# Patient Record
Sex: FEMALE | Race: WHITE | NOT HISPANIC OR LATINO | Employment: FULL TIME | RURAL
[De-identification: names, ages, dates, MRNs, and addresses within clinical notes are randomized per-mention and may not be internally consistent; named-entity substitution may affect disease eponyms.]

---

## 2023-05-10 ENCOUNTER — LAB VISIT (OUTPATIENT)
Dept: LAB | Facility: HOSPITAL | Age: 23
End: 2023-05-10
Attending: REGISTERED NURSE
Payer: COMMERCIAL

## 2023-05-10 ENCOUNTER — OFFICE VISIT (OUTPATIENT)
Dept: FAMILY MEDICINE | Facility: CLINIC | Age: 23
End: 2023-05-10
Payer: COMMERCIAL

## 2023-05-10 VITALS
DIASTOLIC BLOOD PRESSURE: 89 MMHG | SYSTOLIC BLOOD PRESSURE: 133 MMHG | WEIGHT: 180 LBS | TEMPERATURE: 99 F | BODY MASS INDEX: 33.13 KG/M2 | HEART RATE: 90 BPM | HEIGHT: 62 IN | OXYGEN SATURATION: 98 %

## 2023-05-10 DIAGNOSIS — R42 VERTIGO: Primary | ICD-10-CM

## 2023-05-10 DIAGNOSIS — R00.0 TACHYCARDIA: ICD-10-CM

## 2023-05-10 DIAGNOSIS — R00.0 TACHYCARDIA, UNSPECIFIED: Primary | ICD-10-CM

## 2023-05-10 DIAGNOSIS — R42 VERTIGO: ICD-10-CM

## 2023-05-10 DIAGNOSIS — G43.019 INTRACTABLE MIGRAINE WITHOUT AURA AND WITHOUT STATUS MIGRAINOSUS: ICD-10-CM

## 2023-05-10 PROBLEM — F41.9 ANXIETY: Status: ACTIVE | Noted: 2022-09-29

## 2023-05-10 PROBLEM — F42.9 OCD (OBSESSIVE COMPULSIVE DISORDER): Status: ACTIVE | Noted: 2022-09-29

## 2023-05-10 LAB
ALBUMIN SERPL BCP-MCNC: 3.7 G/DL (ref 3.5–5)
ALBUMIN/GLOB SERPL: 0.9 {RATIO}
ALP SERPL-CCNC: 54 U/L (ref 52–144)
ALT SERPL W P-5'-P-CCNC: 24 U/L (ref 13–56)
ANION GAP SERPL CALCULATED.3IONS-SCNC: 12 MMOL/L (ref 7–16)
AST SERPL W P-5'-P-CCNC: 18 U/L (ref 15–37)
BASOPHILS # BLD AUTO: 0.03 K/UL (ref 0–0.2)
BASOPHILS NFR BLD AUTO: 0.3 % (ref 0–1)
BILIRUB SERPL-MCNC: 0.2 MG/DL (ref ?–1.2)
BUN SERPL-MCNC: 11 MG/DL (ref 7–18)
BUN/CREAT SERPL: 16 (ref 6–20)
CALCIUM SERPL-MCNC: 8.7 MG/DL (ref 8.5–10.1)
CHLORIDE SERPL-SCNC: 100 MMOL/L (ref 98–107)
CO2 SERPL-SCNC: 28 MMOL/L (ref 21–32)
CREAT SERPL-MCNC: 0.67 MG/DL (ref 0.55–1.02)
DIFFERENTIAL METHOD BLD: ABNORMAL
EGFR (NO RACE VARIABLE) (RUSH/TITUS): 129 ML/MIN/1.73M2
EOSINOPHIL # BLD AUTO: 0.09 K/UL (ref 0–0.5)
EOSINOPHIL NFR BLD AUTO: 1 % (ref 1–4)
ERYTHROCYTE [DISTWIDTH] IN BLOOD BY AUTOMATED COUNT: 13.2 % (ref 11.5–14.5)
GLOBULIN SER-MCNC: 4.3 G/DL (ref 2–4)
GLUCOSE SERPL-MCNC: 109 MG/DL (ref 74–106)
HCT VFR BLD AUTO: 38.9 % (ref 38–47)
HGB BLD-MCNC: 12.8 G/DL (ref 12–16)
LYMPHOCYTES # BLD AUTO: 3.01 K/UL (ref 1–4.8)
LYMPHOCYTES NFR BLD AUTO: 33.7 % (ref 27–41)
MCH RBC QN AUTO: 27.4 PG (ref 27–31)
MCHC RBC AUTO-ENTMCNC: 32.9 G/DL (ref 32–36)
MCV RBC AUTO: 83.3 FL (ref 80–96)
MONOCYTES # BLD AUTO: 0.78 K/UL (ref 0–0.8)
MONOCYTES NFR BLD AUTO: 8.7 % (ref 2–6)
MPC BLD CALC-MCNC: 9.2 FL (ref 9.4–12.4)
NEUTROPHILS # BLD AUTO: 5.02 K/UL (ref 1.8–7.7)
NEUTROPHILS NFR BLD AUTO: 56.3 % (ref 53–65)
PLATELET # BLD AUTO: 413 K/UL (ref 150–400)
POTASSIUM SERPL-SCNC: 3.5 MMOL/L (ref 3.5–5.1)
PROT SERPL-MCNC: 8 G/DL (ref 6.4–8.2)
RBC # BLD AUTO: 4.67 M/UL (ref 4.2–5.4)
SODIUM SERPL-SCNC: 136 MMOL/L (ref 136–145)
WBC # BLD AUTO: 8.93 K/UL (ref 4.5–11)

## 2023-05-10 PROCEDURE — 83550 IRON BINDING TEST: CPT

## 2023-05-10 PROCEDURE — 80053 COMPREHEN METABOLIC PANEL: CPT

## 2023-05-10 PROCEDURE — 93010 EKG 12-LEAD: ICD-10-PCS | Mod: ,,, | Performed by: INTERNAL MEDICINE

## 2023-05-10 PROCEDURE — 1159F MED LIST DOCD IN RCRD: CPT | Mod: ,,, | Performed by: REGISTERED NURSE

## 2023-05-10 PROCEDURE — 1160F PR REVIEW ALL MEDS BY PRESCRIBER/CLIN PHARMACIST DOCUMENTED: ICD-10-PCS | Mod: ,,, | Performed by: REGISTERED NURSE

## 2023-05-10 PROCEDURE — 93010 ELECTROCARDIOGRAM REPORT: CPT | Mod: ,,, | Performed by: INTERNAL MEDICINE

## 2023-05-10 PROCEDURE — 83540 ASSAY OF IRON: CPT

## 2023-05-10 PROCEDURE — 93005 ELECTROCARDIOGRAM TRACING: CPT

## 2023-05-10 PROCEDURE — 3079F PR MOST RECENT DIASTOLIC BLOOD PRESSURE 80-89 MM HG: ICD-10-PCS | Mod: ,,, | Performed by: REGISTERED NURSE

## 2023-05-10 PROCEDURE — 99204 OFFICE O/P NEW MOD 45 MIN: CPT | Mod: ,,, | Performed by: REGISTERED NURSE

## 2023-05-10 PROCEDURE — 3008F PR BODY MASS INDEX (BMI) DOCUMENTED: ICD-10-PCS | Mod: ,,, | Performed by: REGISTERED NURSE

## 2023-05-10 PROCEDURE — 99204 PR OFFICE/OUTPT VISIT, NEW, LEVL IV, 45-59 MIN: ICD-10-PCS | Mod: ,,, | Performed by: REGISTERED NURSE

## 2023-05-10 PROCEDURE — 3075F PR MOST RECENT SYSTOLIC BLOOD PRESS GE 130-139MM HG: ICD-10-PCS | Mod: ,,, | Performed by: REGISTERED NURSE

## 2023-05-10 PROCEDURE — 1160F RVW MEDS BY RX/DR IN RCRD: CPT | Mod: ,,, | Performed by: REGISTERED NURSE

## 2023-05-10 PROCEDURE — 3079F DIAST BP 80-89 MM HG: CPT | Mod: ,,, | Performed by: REGISTERED NURSE

## 2023-05-10 PROCEDURE — 3008F BODY MASS INDEX DOCD: CPT | Mod: ,,, | Performed by: REGISTERED NURSE

## 2023-05-10 PROCEDURE — 36415 COLL VENOUS BLD VENIPUNCTURE: CPT

## 2023-05-10 PROCEDURE — 3075F SYST BP GE 130 - 139MM HG: CPT | Mod: ,,, | Performed by: REGISTERED NURSE

## 2023-05-10 PROCEDURE — 82746 ASSAY OF FOLIC ACID SERUM: CPT

## 2023-05-10 PROCEDURE — 85025 COMPLETE CBC W/AUTO DIFF WBC: CPT

## 2023-05-10 PROCEDURE — 1159F PR MEDICATION LIST DOCUMENTED IN MEDICAL RECORD: ICD-10-PCS | Mod: ,,, | Performed by: REGISTERED NURSE

## 2023-05-10 RX ORDER — NORETHINDRONE ACETATE AND ETHINYL ESTRADIOL 1MG-20(21)
1 KIT ORAL DAILY
COMMUNITY
Start: 2022-09-06 | End: 2023-11-01 | Stop reason: ALTCHOICE

## 2023-05-10 RX ORDER — BUTALBITAL, ACETAMINOPHEN AND CAFFEINE 50; 325; 40 MG/1; MG/1; MG/1
1 TABLET ORAL EVERY 6 HOURS PRN
Qty: 30 TABLET | Refills: 0 | Status: SHIPPED | OUTPATIENT
Start: 2023-05-10 | End: 2023-07-09

## 2023-05-10 RX ORDER — UBROGEPANT 50 MG/1
50 TABLET ORAL
Qty: 10 TABLET | Refills: 0 | Status: SHIPPED | OUTPATIENT
Start: 2023-05-10 | End: 2023-11-01 | Stop reason: ALTCHOICE

## 2023-05-10 RX ORDER — FLUVOXAMINE MALEATE 50 MG/1
1 TABLET ORAL NIGHTLY
COMMUNITY
Start: 2022-08-05

## 2023-05-10 NOTE — PROGRESS NOTES
MAINE Moser        PATIENT NAME: Nate Caceres  : 2000  DATE: 5/10/23  MRN: [id number]      Billing Provider: MAINE Moser  Level of Service: [place] OFFICE/OUTPT VISIT, RUBÉNJAVIER MAGDALENO, 45-59 MIN  Patient PCP Information       Provider PCP Type    Primary Doctor No General            Reason for Visit / Chief Complaint: Establish Care (Wants to discuss some issues she's been having.)       Update PCP  Update Chief Complaint         History of Present Illness / Problem Focused Workflow      Migraine   This is a recurrent problem. The current episode started more than 1 year ago. The problem occurs daily. The problem has been waxing and waning. The pain is located in the Bilateral region. The pain quality is similar to prior headaches. The quality of the pain is described as band-like, sharp, stabbing, squeezing, throbbing, shooting, pulsating and aching. The pain is at a severity of 3/10. The pain is mild. Associated symptoms include anorexia, dizziness, a loss of balance, muscle aches, nausea, photophobia, scalp tenderness, tinnitus, vomiting and weakness. Pertinent negatives include no abdominal pain, abnormal behavior, back pain, blurred vision, coughing, ear pain, eye pain, eye redness, eye watering, fever, hearing loss, insomnia, neck pain, rhinorrhea, seizures, sinus pressure, sore throat, swollen glands, tingling, visual change or weight loss. The symptoms are aggravated by activity, bright light, fatigue, emotional stress, menstrual cycle, weather changes and work. She has tried acetaminophen, darkened room, cold packs, NSAIDs, antidepressants and Excedrin for the symptoms. The treatment provided mild relief. Her past medical history is significant for migraine headaches, migraines in the family and obesity. There is no history of cancer, cluster headaches, hypertension, immunosuppression, pseudotumor cerebri, recent head traumas, sinus disease or TMJ.   Dizziness:   Chronicity:   Recurrent  Onset:  More than 1 month ago  Progression since onset:  Waxing and waning  Frequency:  Every few days  Pain Scale:  0/10  Duration:  5 minutes  Dizziness characteristics:  Spacial disorientation, sensation of movement, off-balance, lightheaded/impending faint, trouble focusing eyes and walking on uneven surface  Duration of Spells:  5 minutes   Associated symptoms: headaches, tinnitus, nausea, vomiting, diaphoresis, weakness, light-headedness and palpitations.no hearing loss, no ear congestion, no ear pain, no fever, no aural fullness, no visual disturbances, no syncope, no panic, no facial weakness, no slurred speech, no numbness in extremities and no chest pain.  Aggravated by:  Nothing  Treatments tried:  Body position changes and rest  Improvements on treatment:  No relief   PMH includes: anxiety.no strokes, no cardiac surgery, no neurologic disease, no head trauma, no balance testing, no ear trauma, no ear surgery, no head trauma, no ear infections, no ear tubes, no environmental allergies, no MRI head and no CT head.    Review of Systems     Review of Systems   Constitutional:  Positive for diaphoresis and fatigue. Negative for fever and weight loss.   HENT:  Positive for tinnitus. Negative for ear pain, hearing loss, rhinorrhea, sinus pressure/congestion and sore throat.    Eyes:  Positive for photophobia. Negative for blurred vision, pain and redness.   Respiratory:  Negative for cough.    Cardiovascular:  Positive for palpitations. Negative for chest pain and syncope.   Gastrointestinal:  Positive for anorexia, nausea and vomiting. Negative for abdominal pain.   Endocrine: Positive for polydipsia and polyphagia.   Genitourinary:  Positive for frequency and urgency.   Musculoskeletal:  Negative for back pain and neck pain.   Allergic/Immunologic: Negative for environmental allergies.   Neurological:  Positive for dizziness, vertigo, weakness, light-headedness, headaches, coordination  difficulties, loss of balance and coordination difficulties. Negative for tingling and seizures.   Psychiatric/Behavioral:  The patient is nervous/anxious and is hyperactive. The patient does not have insomnia.       Medical / Social / Family History     Past Medical History:   Diagnosis Date    Anemia      No past surgical history on file.    Social History  Ms. Nate Caceres  reports that she has never smoked. She has never been exposed to tobacco smoke. She has never used smokeless tobacco. She reports that she does not drink alcohol and does not use drugs.    Family History  Ms. Nate Caceres's family history is not on file.    Medications and Allergies     Medications  Outpatient Medications Marked as Taking for the 5/10/23 encounter (Office Visit) with MAINE Moser   Medication Sig Dispense Refill    fluvoxaMINE (LUVOX) 50 MG Tab tablet Take 1 tablet by mouth every evening.      norethindrone-ethinyl estradiol (JUNEL FE 1/20) 1 mg-20 mcg (21)/75 mg (7) per tablet Take 1 tablet by mouth once daily.         Allergies  Review of patient's allergies indicates:  No Known Allergies    Physical Examination     Vitals:    05/10/23 1551   BP: 133/89   Pulse: 90   Temp: 98.8 °F (37.1 °C)     Physical Exam  Vitals and nursing note reviewed.   Constitutional:       Appearance: Normal appearance. She is obese.   HENT:      Head: Normocephalic and atraumatic.      Nose: Nose normal.   Cardiovascular:      Rate and Rhythm: Regular rhythm.      Heart sounds: Normal heart sounds.   Pulmonary:      Effort: Pulmonary effort is normal.      Breath sounds: Normal breath sounds.   Genitourinary:     Vagina: No vaginal discharge.   Musculoskeletal:         General: Normal range of motion.      Cervical back: Normal range of motion and neck supple.   Skin:     General: Skin is warm and dry.   Neurological:      Mental Status: She is alert and oriented to person, place, and time.   Psychiatric:          Behavior: Behavior normal. Behavior is cooperative.        Assessment and Plan (including Health Maintenance)      Problem List  Smart Sets  Document Outside HM   Plan:   Vertigo  -     POCT glucose  -     Urinalysis, Reflex to Urine Culture; Future; Expected date: 05/10/2023  -     CBC Auto Differential; Future; Expected date: 05/10/2023  -     Comprehensive Metabolic Panel; Future; Expected date: 05/10/2023  -     Microalbumin/Creatinine Ratio, Urine; Future; Expected date: 05/10/2023  -     Iron and TIBC; Future; Expected date: 05/10/2023  -     Folate; Future; Expected date: 05/10/2023    Intractable migraine without aura and without status migrainosus  -     ubrogepant (UBRELVY) 50 mg tablet; Take 1 tablet (50 mg total) by mouth as needed for Migraine (Take 1 tablet at onset of Migraine.).  Dispense: 10 tablet; Refill: 0  -     butalbital-acetaminophen-caffeine -40 mg (FIORICET, ESGIC) -40 mg per tablet; Take 1 tablet by mouth every 6 (six) hours as needed for Headaches.  Dispense: 30 tablet; Refill: 0    Mrs. Caceres presented to clinic today to establish care with new primary care provider. She has concerns regarding episodes of diaphoresis and vertigo which started happening several months ago. She denies change in medication during time episodes began. She has no prior history of Diabetes or Thyroid disease. She does suffer from anxiety and OCD, is followed by Psychologist for treatment with [facility]. She has history of Migraines, states these have recently become more frequent and episodes are harder to treat. She had to leave work early on Monday due to a severe headache with nausea. No blurred vision, no shortness of breath. She admits to having occasional palpitations.  Reviewed dietary intake and otc medications used in treatment of migraines. She has not seen a Neurologist since adolescence and has not been treated for anemia and folic acid deficiency since she was 18. Last papsmear was done  in September, she did not have lab work done, cannot recall last time she had any labwork checked.     Prescribed Ubrelvy 50mg, take 1 tablet at onset of Migraine, maximum dosing is 100mg per 24 hours. She will call back to clinic if cost is unaffordable or insurance does not approve. Gave short duration of Fiorcet and instructed, take Ubrelvy first, but if you find it is not helping or headache is worsening, may take Fiorcet once every 6 hours as needed. Do not make a habit of taking Fiorcet and Ubrelvy together, please only take one or the other unless Migraine is not resolving or become more severe than normal. Will review lab work and treat accordingly. Will follow-up with CT or MRI if condition warrants.       Health Maintenance Due   Topic Date Due    Hepatitis C Screening  Never done    Lipid Panel  Never done    HPV Vaccines (1 - 2-dose series) Never done    HIV Screening  Never done    Chlamydia Screening  Never done    TETANUS VACCINE  Never done    COVID-19 Vaccine (3 - Booster for Pfizer series) 03/18/2022     Problem List Items Addressed This Visit    None  Visit Diagnoses       Vertigo    -  Primary    Relevant Orders    POCT glucose    Urinalysis, Reflex to Urine Culture    CBC Auto Differential (Completed)    Comprehensive Metabolic Panel (Completed)    Microalbumin/Creatinine Ratio, Urine    Iron and TIBC    Folate    Intractable migraine without aura and without status migrainosus        Relevant Medications    ubrogepant (UBRELVY) 50 mg tablet    butalbital-acetaminophen-caffeine -40 mg (FIORICET, ESGIC) -40 mg per tablet          The patient has no Health Maintenance topics of status Not Due    Future Appointments   Date Time Provider Department Center   5/11/2023  8:00 AM EKG, [facility]      There are no Patient Instructions on file for this visit.  Follow up in about 4 weeks (around 6/7/2023).     Signature:  MAINE Moser      Date of encounter:  5/10/23

## 2023-05-11 LAB
FOLATE SERPL-MCNC: >20 NG/ML (ref 3.1–17.5)
IRON SATN MFR SERPL: 5 % (ref 14–50)
IRON SERPL-MCNC: 26 ΜG/DL (ref 50–170)
TIBC SERPL-MCNC: 473 ΜG/DL (ref 250–450)

## 2023-05-12 NOTE — PROGRESS NOTES
Discussed with pt; she will  iron tabs and fioricet at pharmacy  She prefers [facility] for cardiac referral and wants to wait a little while on neurology referral.

## 2023-06-29 ENCOUNTER — PATIENT MESSAGE (OUTPATIENT)
Dept: FAMILY MEDICINE | Facility: CLINIC | Age: 23
End: 2023-06-29
Payer: COMMERCIAL

## 2023-06-29 DIAGNOSIS — R00.0 TACHYCARDIA: Primary | ICD-10-CM

## 2023-11-01 ENCOUNTER — OFFICE VISIT (OUTPATIENT)
Dept: FAMILY MEDICINE | Facility: CLINIC | Age: 23
End: 2023-11-01
Payer: COMMERCIAL

## 2023-11-01 VITALS
OXYGEN SATURATION: 100 % | BODY MASS INDEX: 33.93 KG/M2 | HEIGHT: 62 IN | DIASTOLIC BLOOD PRESSURE: 75 MMHG | WEIGHT: 184.38 LBS | HEART RATE: 125 BPM | SYSTOLIC BLOOD PRESSURE: 106 MMHG | TEMPERATURE: 100 F

## 2023-11-01 DIAGNOSIS — M54.50 ACUTE BILATERAL LOW BACK PAIN WITHOUT SCIATICA: ICD-10-CM

## 2023-11-01 DIAGNOSIS — U07.1 COVID-19: Primary | ICD-10-CM

## 2023-11-01 LAB
BACTERIA #/AREA URNS HPF: ABNORMAL /HPF
BILIRUB SERPL-MCNC: NORMAL MG/DL
BILIRUB UR QL STRIP: NEGATIVE
BLOOD URINE, POC: NORMAL
CLARITY UR: CLEAR
COLOR UR: ABNORMAL
COLOR, POC UA: YELLOW
CTP QC/QA: YES
CTP QC/QA: YES
FLUAV AG NPH QL: NEGATIVE
FLUBV AG NPH QL: NEGATIVE
GLUCOSE UR QL STRIP: NORMAL
GLUCOSE UR STRIP-MCNC: NORMAL MG/DL
KETONES UR QL STRIP: NORMAL
KETONES UR STRIP-SCNC: NEGATIVE MG/DL
LEUKOCYTE ESTERASE UR QL STRIP: ABNORMAL
LEUKOCYTE ESTERASE URINE, POC: NORMAL
MUCOUS, UA: ABNORMAL /LPF
NITRITE UR QL STRIP: NEGATIVE
NITRITE, POC UA: NORMAL
PH UR STRIP: 7 PH UNITS
PH, POC UA: 7
PROT UR QL STRIP: NEGATIVE
PROTEIN, POC: NORMAL
RBC # UR STRIP: ABNORMAL /UL
RBC #/AREA URNS HPF: 8 /HPF
S PYO RRNA THROAT QL PROBE: NEGATIVE
SARS-COV-2 AG RESP QL IA.RAPID: POSITIVE
SP GR UR STRIP: 1.01
SPECIFIC GRAVITY, POC UA: 1.01
SQUAMOUS #/AREA URNS LPF: ABNORMAL /HPF
UROBILINOGEN UR STRIP-ACNC: NORMAL MG/DL
UROBILINOGEN, POC UA: 0.2
WBC #/AREA URNS HPF: 1 /HPF

## 2023-11-01 PROCEDURE — 3074F SYST BP LT 130 MM HG: CPT | Mod: ,,, | Performed by: REGISTERED NURSE

## 2023-11-01 PROCEDURE — 3074F PR MOST RECENT SYSTOLIC BLOOD PRESSURE < 130 MM HG: ICD-10-PCS | Mod: ,,, | Performed by: REGISTERED NURSE

## 2023-11-01 PROCEDURE — 3078F DIAST BP <80 MM HG: CPT | Mod: ,,, | Performed by: REGISTERED NURSE

## 2023-11-01 PROCEDURE — 81003 POCT URINALYSIS W/O SCOPE: ICD-10-PCS | Mod: QW,,, | Performed by: REGISTERED NURSE

## 2023-11-01 PROCEDURE — 3008F PR BODY MASS INDEX (BMI) DOCUMENTED: ICD-10-PCS | Mod: ,,, | Performed by: REGISTERED NURSE

## 2023-11-01 PROCEDURE — 1160F RVW MEDS BY RX/DR IN RCRD: CPT | Mod: ,,, | Performed by: REGISTERED NURSE

## 2023-11-01 PROCEDURE — 87880 STREP A ASSAY W/OPTIC: CPT | Mod: QW,,, | Performed by: REGISTERED NURSE

## 2023-11-01 PROCEDURE — 81001 URINALYSIS AUTO W/SCOPE: CPT | Mod: ,,, | Performed by: CLINICAL MEDICAL LABORATORY

## 2023-11-01 PROCEDURE — 81003 URINALYSIS AUTO W/O SCOPE: CPT | Mod: QW,,, | Performed by: REGISTERED NURSE

## 2023-11-01 PROCEDURE — 1160F PR REVIEW ALL MEDS BY PRESCRIBER/CLIN PHARMACIST DOCUMENTED: ICD-10-PCS | Mod: ,,, | Performed by: REGISTERED NURSE

## 2023-11-01 PROCEDURE — 87428 SARSCOV & INF VIR A&B AG IA: CPT | Mod: QW,,, | Performed by: REGISTERED NURSE

## 2023-11-01 PROCEDURE — 99213 OFFICE O/P EST LOW 20 MIN: CPT | Mod: ,,, | Performed by: REGISTERED NURSE

## 2023-11-01 PROCEDURE — 87880 POCT RAPID STREP A: ICD-10-PCS | Mod: QW,,, | Performed by: REGISTERED NURSE

## 2023-11-01 PROCEDURE — 81001 URINALYSIS, REFLEX TO URINE CULTURE: ICD-10-PCS | Mod: ,,, | Performed by: CLINICAL MEDICAL LABORATORY

## 2023-11-01 PROCEDURE — 3008F BODY MASS INDEX DOCD: CPT | Mod: ,,, | Performed by: REGISTERED NURSE

## 2023-11-01 PROCEDURE — 1159F MED LIST DOCD IN RCRD: CPT | Mod: ,,, | Performed by: REGISTERED NURSE

## 2023-11-01 PROCEDURE — 3078F PR MOST RECENT DIASTOLIC BLOOD PRESSURE < 80 MM HG: ICD-10-PCS | Mod: ,,, | Performed by: REGISTERED NURSE

## 2023-11-01 PROCEDURE — 1159F PR MEDICATION LIST DOCUMENTED IN MEDICAL RECORD: ICD-10-PCS | Mod: ,,, | Performed by: REGISTERED NURSE

## 2023-11-01 PROCEDURE — 87428 POCT SARS-COV2 (COVID) WITH FLU ANTIGEN: ICD-10-PCS | Mod: QW,,, | Performed by: REGISTERED NURSE

## 2023-11-01 PROCEDURE — 99213 PR OFFICE/OUTPT VISIT, EST, LEVL III, 20-29 MIN: ICD-10-PCS | Mod: ,,, | Performed by: REGISTERED NURSE

## 2023-11-01 RX ORDER — ALBUTEROL SULFATE 90 UG/1
2 AEROSOL, METERED RESPIRATORY (INHALATION) EVERY 6 HOURS PRN
Qty: 18 G | Refills: 0 | Status: SHIPPED | OUTPATIENT
Start: 2023-11-01 | End: 2024-10-31

## 2023-11-01 RX ORDER — AZITHROMYCIN 250 MG/1
TABLET, FILM COATED ORAL
Qty: 6 TABLET | Refills: 0 | Status: SHIPPED | OUTPATIENT
Start: 2023-11-01 | End: 2023-11-06

## 2023-11-01 RX ORDER — VITAMIN A 3000 MCG
2 CAPSULE ORAL
Qty: 30 ML | Refills: 0 | Status: SHIPPED | OUTPATIENT
Start: 2023-11-01 | End: 2023-11-11

## 2023-11-01 NOTE — LETTER
November 1, 2023      [facility] [address]  [place] [address]  Phone: [phone]  Fax: [phone]       Patient: Nate Caceres   YOB: 2000  Date of Visit: 11/01/2023    To Whom It May Concern:    Hermelinda Caceres  was at [facility] on 11/01/2023. The patient may return to work on 11/06/2023 with no restrictions. If you have any questions or concerns, or if I can be of further assistance, please do not hesitate to contact me.    Sincerely,        MAINE Moser

## 2023-11-01 NOTE — PROGRESS NOTES
MAINE Moser        PATIENT NAME: Nate Caceres  : 2000  DATE: 23  MRN: [id number]      Billing Provider: MAINE Moser  Level of Service: [place] OFFICE/OUTPT VISIT, EST, LEVL III, 20-29 MIN  Patient PCP Information       Provider PCP Type    MAINE Moser General            Reason for Visit / Chief Complaint: Generalized Body Aches, Neck Pain, and Nasal Congestion (Symptoms started this morning)       Update PCP  Update Chief Complaint         History of Present Illness / Problem Focused Workflow      Back Pain  This is a new problem. The current episode started today. The problem occurs constantly. The problem has been waxing and waning since onset. The pain is present in the lumbar spine. The quality of the pain is described as aching. The pain does not radiate. The pain is at a severity of 4/10. The pain is moderate. The pain is Worse during the day. The symptoms are aggravated by bending, position and standing. Pertinent negatives include no abdominal pain, bladder incontinence, bowel incontinence, chest pain, dysuria, fever, headaches, leg pain, numbness, paresis, paresthesias, pelvic pain, perianal numbness, tingling, weakness or weight loss. Risk factors: 14 weeks pregnant. Treatments tried: Tylenol Cold and Sinus Severe. The treatment provided mild relief.   Sinus Problem  This is a new problem. The current episode started today. The problem has been gradually worsening since onset. The maximum temperature recorded prior to her arrival was 100.4 - 100.9 F. The fever has been present for Less than 1 day. Her pain is at a severity of 3/10. The pain is mild. Associated symptoms include chills, congestion, a hoarse voice and sinus pressure. Pertinent negatives include no coughing, diaphoresis, ear pain, headaches, shortness of breath or sore throat. Past treatments include acetaminophen, oral decongestants and sitting up. The treatment provided mild relief.     Mrs.  Morris is a 23-year-old WF here with complaints of sudden onset lower back pain, generalized body aches, and nasal drainage. Symptoms started this morning. She denies know fever, temp here 99.8, she took Tylenol Cold and Sinus Severe, 1/2 tablet this morning. Cough is minimal and infrequent. Nasal drainage clear, no epistaxis. She denies decreased urine output, states she has been drinking per normal. Mrs. Caceres is 14 weeks pregnant, she is scheduled to see Dr. Mcgraw at [facility] again on Tuesday 11/07/2023. She has history of Tachycardia, states Cardiologist had concerns regarding abnormalities noted during EKG and Echo but she could not have further testing due to early pregnancy. She is being monitored by Cardiologist and post delivery will have further assessment/work-up done.     Review of Systems     Review of Systems   Constitutional:  Positive for chills. Negative for diaphoresis, fever and weight loss.   HENT:  Positive for nasal congestion, hoarse voice and sinus pressure/congestion. Negative for ear pain and sore throat.    Respiratory:  Negative for cough and shortness of breath.    Cardiovascular:  Negative for chest pain.   Gastrointestinal:  Negative for abdominal pain and bowel incontinence.   Genitourinary:  Negative for bladder incontinence, dysuria and pelvic pain.   Musculoskeletal:  Positive for back pain. Negative for leg pain.   Neurological:  Negative for tingling, weakness, numbness, headaches and paresthesias.      Medical / Social / Family History     Past Medical History:   Diagnosis Date    Anemia      No past surgical history on file.    Social History  Ms. Nate Caceres  reports that she has never smoked. She has never been exposed to tobacco smoke. She has never used smokeless tobacco. She reports that she does not drink alcohol and does not use drugs.    Family History  Ms. Nate Caceres's family history is not on file.    Medications and  Allergies     Medications  No outpatient medications have been marked as taking for the 11/1/23 encounter (Office Visit) with Suhas Arzola FNP.     Allergies  Review of patient's allergies indicates:   Allergen Reactions    Tamiflu [oseltamivir] Nausea And Vomiting     Physical Examination     Vitals:    11/01/23 1126   BP: 106/75   Pulse: (!) 125   Temp: 99.8 °F (37.7 °C)     Physical Exam  Vitals and nursing note reviewed.   Constitutional:       Appearance: Normal appearance.   HENT:      Head: Normocephalic and atraumatic.      Nose: Congestion present.      Mouth/Throat:      Mouth: Mucous membranes are moist.      Pharynx: Posterior oropharyngeal erythema present.   Eyes:      General:         Right eye: No discharge.         Left eye: No discharge.   Cardiovascular:      Rate and Rhythm: Regular rhythm. Tachycardia present.      Heart sounds: Normal heart sounds.   Pulmonary:      Effort: Pulmonary effort is normal.      Breath sounds: Normal breath sounds.   Musculoskeletal:      Cervical back: Normal range of motion.      Lumbar back: Tenderness present.   Skin:     General: Skin is warm and dry.   Neurological:      Mental Status: She is alert.          Assessment and Plan (including Health Maintenance)      Problem List  Smart Sets  Document Outside HM   Plan:   COVID-19  -     POCT rapid strep A  -     POCT SARS-COV2 (COVID) with Flu Antigen  -     albuterol (VENTOLIN HFA) 90 mcg/actuation inhaler; Inhale 2 puffs into the lungs every 6 (six) hours as needed for Wheezing. Rescue  Dispense: 18 g; Refill: 0  -     azithromycin (Z-RUDDY) 250 MG tablet; Take 2 tablets by mouth on day 1; Take 1 tablet by mouth on days 2-5  Dispense: 6 tablet; Refill: 0  -     sodium chloride (SALINE NASAL) 0.65 % nasal spray; 2 sprays by Nasal route as needed for Congestion.  Dispense: 30 mL; Refill: 0    Acute bilateral low back pain without sciatica  -     POCT URINALYSIS W/O SCOPE  -     Urinalysis, Reflex to Urine  Culture; Future; Expected date: 11/01/2023    Azithromycin and Albuterol inhaler sent to pharmacy. She has no known history of asthma or other airway disease. Instructed her to call her OBGYN and inform them of results. Prescriptions will be available at the pharmacy if her symptoms worsen or if she experiences shortness of breath, wheezing, or unresolved cough. She is encouraged to try conservative treatment first. Saline nasal spray, Tylenol for pain or fever, may continue Tylenol Cold and Sinus (advised against Tylenol Cold and Sinus Severe due to ingredients for mucous expectorant unless approved by her OBGYN), increase fluid intake and stay active. Get up every 2-3 hours and walk around inside home. She can use a cool mist humidifier if this helps with symptoms. Encouraged Robitussin as needed for cough. Copy of lab work and clinic note will be sent to Dr. Mcgraw for MD records. Discussed symptoms of dehydration and signs/symptoms of respiratory decline/distress. Work excuse provided, she will call back to clinic if condition has not improved by Monday when she has plans to return to work.    Repeatedly encouraged Mrs. Caceres to contact her OBGYN to discuss test results. She indicates understanding, has good understanding of diagnosis and concerns for pregnancy and COVID. It was discussed her OBGYN may have suggestions other than those provided here. Urine sample sent to [facility] for Urinalysis, C&S if indicated. Will have her return for repeat urine if OBGYN suggests or if low back pain does not improve with increased fluid and Tylenol.    Health Maintenance Due   Topic Date Due    Hepatitis C Screening  Never done    Lipid Panel  Never done    HPV Vaccines (1 - 2-dose series) Never done    HIV Screening  Never done    Chlamydia Screening  Never done    TETANUS VACCINE  Never done    Pap Smear  Never done    Influenza Vaccine (1) 09/01/2023    COVID-19 Vaccine (3 - 2023-24 season) 09/01/2023      Problem List Items Addressed This Visit    None  Visit Diagnoses       COVID-19    -  Primary    Relevant Medications    albuterol (VENTOLIN HFA) 90 mcg/actuation inhaler    azithromycin (Z-RUDDY) 250 MG tablet    sodium chloride (SALINE NASAL) 0.65 % nasal spray    Other Relevant Orders    POCT rapid strep A (Completed)    POCT SARS-COV2 (COVID) with Flu Antigen (Completed)    Acute bilateral low back pain without sciatica        Relevant Orders    POCT URINALYSIS W/O SCOPE (Completed)    Urinalysis, Reflex to Urine Culture          The patient has no Health Maintenance topics of status Not Due    No future appointments.     Patient Instructions   Increase fluid intake to stay hydrated. Take all doses of antibiotic therapy as prescribed. Do not stop taking when symptoms resolve, complete dosing. May take Tylenol or ibuprofen for fever or pain. Stay active, move around at least every 2 hours when awake. Go to the ED for any worsened condition or difficulty breathing. Return to clinic if condition persists.    No follow-ups on file.     Signature:  MAINE Mosre    Date of encounter: 11/1/23